# Patient Record
(demographics unavailable — no encounter records)

---

## 2017-07-20 PROBLEM — N90.89 LABIAL LESION: Status: ACTIVE | Noted: 2017-07-20

## 2018-03-24 RX ORDER — METHYLERGONOVINE MALEATE 0.2 MG/ML
0.2 INJECTION INTRAVENOUS AS NEEDED
Status: CANCELLED | OUTPATIENT
Start: 2018-03-24

## 2018-03-24 RX ORDER — NALBUPHINE HYDROCHLORIDE 10 MG/ML
10 INJECTION, SOLUTION INTRAMUSCULAR; INTRAVENOUS; SUBCUTANEOUS
Status: CANCELLED | OUTPATIENT
Start: 2018-03-24

## 2018-03-24 RX ORDER — LIDOCAINE HYDROCHLORIDE 10 MG/ML
20 INJECTION, SOLUTION EPIDURAL; INFILTRATION; INTRACAUDAL; PERINEURAL AS NEEDED
Status: CANCELLED | OUTPATIENT
Start: 2018-03-24

## 2018-03-24 RX ORDER — OXYTOCIN 10 [USP'U]/ML
10 INJECTION, SOLUTION INTRAMUSCULAR; INTRAVENOUS
Status: CANCELLED | OUTPATIENT
Start: 2018-03-24

## 2018-03-24 RX ORDER — OXYTOCIN/RINGER'S LACTATE 20/1000 ML
125 PLASTIC BAG, INJECTION (ML) INTRAVENOUS CONTINUOUS
Status: CANCELLED | OUTPATIENT
Start: 2018-03-24

## 2018-03-24 RX ORDER — MAG HYDROX/ALUMINUM HYD/SIMETH 200-200-20
15 SUSPENSION, ORAL (FINAL DOSE FORM) ORAL
Status: CANCELLED | OUTPATIENT
Start: 2018-03-24

## 2018-03-24 RX ORDER — SALICYLIC ACID
90 POWDER (GRAM) MISCELLANEOUS ONCE
Status: CANCELLED | OUTPATIENT
Start: 2018-03-24 | End: 2018-03-24

## 2018-03-24 RX ORDER — OXYTOCIN/RINGER'S LACTATE 20/1000 ML
500 PLASTIC BAG, INJECTION (ML) INTRAVENOUS ONCE
Status: CANCELLED | OUTPATIENT
Start: 2018-03-24 | End: 2018-03-24

## 2018-03-24 RX ORDER — TERBUTALINE SULFATE 1 MG/ML
0.25 INJECTION SUBCUTANEOUS
Status: CANCELLED | OUTPATIENT
Start: 2018-03-24

## 2018-03-24 RX ORDER — SODIUM CHLORIDE, SODIUM LACTATE, POTASSIUM CHLORIDE, CALCIUM CHLORIDE 600; 310; 30; 20 MG/100ML; MG/100ML; MG/100ML; MG/100ML
125 INJECTION, SOLUTION INTRAVENOUS CONTINUOUS
Status: CANCELLED | OUTPATIENT
Start: 2018-03-24

## 2018-03-24 RX ORDER — MISOPROSTOL 200 UG/1
800 TABLET ORAL
Status: CANCELLED | OUTPATIENT
Start: 2018-03-24

## 2018-03-24 NOTE — H&P
History & Physical    Name: Krysta Ellis MRN: <[id number]>  SSN: [id number]    YOB: 1986  Age: 32 y.o. Sex: female        Subjective:     Estimated Date of Delivery: 3/14/17. OB History      Para Term  AB Living    1 0 0 0 0 0    SAB TAB Ectopic Molar Multiple Live Births    0 0 0 0 0 0            Krysta Ellis called with c/o q 2 minute, painful ctxs. Denies lof. +vomiting and vb. She is advised to come to unit and anticipate admission with pregnancy at 41.3 for active labor. Prenatal course was normal. Please see prenatal records for details. She began prenatal care with [facility] on 2017 at 17.2 wks GA confirmed by [facility]. She participated in the [facility] program. Her total weight gain for this pregnancy has been approx 32 lbs. No Known Allergies  Family History   Problem Relation Age of Onset    Heart Disease Father     Hypertension Father       reports that she has never smoked. She has never used smokeless tobacco. She reports that she drinks alcohol. She reports that she does not use illicit drugs. Objective:     Vitals:  Last ov 3/23/18   /62  SVE 2/70/-1  EFW 8lb by ultrasound    Prenatal Labs:   Apos, immune, HIV/HepB/HepC/GC/CT neg, VDRL NR    Group B Strep was negative. Assessment/Plan:     Plan: Admit to 3416 for labor. She has taken CBE and has a  in preparation for unmedicated birth. Anticipate vaginal birth. Will assess and update Dr. Milady Rebollar upon her arrival    Signed By:  Theresa Faye CNM     2018